# Patient Record
Sex: FEMALE | Race: WHITE | NOT HISPANIC OR LATINO | Employment: UNEMPLOYED | URBAN - METROPOLITAN AREA
[De-identification: names, ages, dates, MRNs, and addresses within clinical notes are randomized per-mention and may not be internally consistent; named-entity substitution may affect disease eponyms.]

---

## 2024-08-13 ENCOUNTER — NURSE TRIAGE (OUTPATIENT)
Age: 34
End: 2024-08-13

## 2024-08-13 NOTE — TELEPHONE ENCOUNTER
Regarding: rash  ----- Message from Ena HUA sent at 8/13/2024  9:53 AM EDT -----  [facility], she has a rash on her inner thigh that is itching and painful. She also mentioned possibly having thrush as well.

## 2024-08-13 NOTE — TELEPHONE ENCOUNTER
"Patient is calling in, she is a new patient, she recently moved to the area. She started with a red raised rash on the inner folds of her groin. She reports the area is itchy and painful. I got her in for 8/20, are we able to see if she can be seen prior to that? If not discussed urgent care since she is uncomfortable     Reason for Disposition   Red, moist, irritated area between skin folds (or under larger breasts)    Answer Assessment - Initial Assessment Questions  1. APPEARANCE of RASH: \"Describe the rash.\"       Red, raised, one spot  2. LOCATION: \"Where is the rash located?\"       Inner groin  3. NUMBER: \"How many spots are there?\"       One on each size  4. SIZE: \"How big are the spots?\" (Inches, centimeters or compare to size of a coin)       Larger per patient   5. ONSET: \"When did the rash start?\"       Yesterday noticed itching and pain  6. ITCHING: \"Does the rash itch?\" If Yes, ask: \"How bad is the itch?\"  (Scale 1-10; or mild, moderate, severe)      Yes   7. PAIN: \"Does the rash hurt?\" If Yes, ask: \"How bad is the pain?\"  (Scale 1-10; or mild, moderate, severe)      Yes    Protocols used: Rash or Redness - Localized-ADULT-[place]    "

## 2024-08-14 ENCOUNTER — OFFICE VISIT (OUTPATIENT)
Dept: OBGYN CLINIC | Facility: CLINIC | Age: 34
End: 2024-08-14
Payer: COMMERCIAL

## 2024-08-14 VITALS
WEIGHT: 233.6 LBS | SYSTOLIC BLOOD PRESSURE: 110 MMHG | BODY MASS INDEX: 35.4 KG/M2 | HEIGHT: 68 IN | DIASTOLIC BLOOD PRESSURE: 68 MMHG

## 2024-08-14 DIAGNOSIS — N90.89 VULVAR LESION: ICD-10-CM

## 2024-08-14 DIAGNOSIS — N76.0 VULVOVAGINITIS: Primary | ICD-10-CM

## 2024-08-14 DIAGNOSIS — Z11.3 SCREEN FOR STD (SEXUALLY TRANSMITTED DISEASE): ICD-10-CM

## 2024-08-14 PROCEDURE — 87660 TRICHOMONAS VAGIN DIR PROBE: CPT | Performed by: PHYSICIAN ASSISTANT

## 2024-08-14 PROCEDURE — 87510 GARDNER VAG DNA DIR PROBE: CPT | Performed by: PHYSICIAN ASSISTANT

## 2024-08-14 PROCEDURE — 87480 CANDIDA DNA DIR PROBE: CPT | Performed by: PHYSICIAN ASSISTANT

## 2024-08-14 PROCEDURE — 87491 CHLMYD TRACH DNA AMP PROBE: CPT | Performed by: PHYSICIAN ASSISTANT

## 2024-08-14 PROCEDURE — 87591 N.GONORRHOEAE DNA AMP PROB: CPT | Performed by: PHYSICIAN ASSISTANT

## 2024-08-14 PROCEDURE — 99203 OFFICE O/P NEW LOW 30 MIN: CPT | Performed by: PHYSICIAN ASSISTANT

## 2024-08-14 PROCEDURE — 87529 HSV DNA AMP PROBE: CPT | Performed by: PHYSICIAN ASSISTANT

## 2024-08-14 RX ORDER — FLUCONAZOLE 150 MG/1
150 TABLET ORAL ONCE
Qty: 1 TABLET | Refills: 1 | Status: SHIPPED | OUTPATIENT
Start: 2024-08-14 | End: 2024-08-14
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
## 2024-08-14 NOTE — PATIENT INSTRUCTIONS
Rx for Diflucan 150 mg x 1 dose with 1 refill sent to pharmacy. Take first dose today, then again in 48 hours if no improvement.    Call if symptoms worsen or change.

## 2024-08-15 ENCOUNTER — TELEPHONE (OUTPATIENT)
Dept: OBGYN CLINIC | Facility: CLINIC | Age: 34
End: 2024-08-15

## 2024-08-15 DIAGNOSIS — B96.89 BV (BACTERIAL VAGINOSIS): Primary | ICD-10-CM

## 2024-08-15 DIAGNOSIS — N76.0 BV (BACTERIAL VAGINOSIS): Primary | ICD-10-CM

## 2024-08-15 LAB
C TRACH DNA SPEC QL NAA+PROBE: NEGATIVE
CANDIDA RRNA VAG QL PROBE: DETECTED
G VAGINALIS RRNA GENITAL QL PROBE: DETECTED
HSV1 DNA SPEC QL NAA+PROBE: NOT DETECTED
HSV1 DNA SPEC QL NAA+PROBE: NOT DETECTED
N GONORRHOEA DNA SPEC QL NAA+PROBE: NEGATIVE
T VAGINALIS RRNA GENITAL QL PROBE: NOT DETECTED

## 2024-08-15 RX ORDER — CLINDAMYCIN PHOSPHATE 20 MG/G
1 CREAM VAGINAL
Qty: 40 G | Refills: 0 | Status: SHIPPED | OUTPATIENT
Start: 2024-08-15 | End: 2024-08-22

## 2024-08-15 NOTE — TELEPHONE ENCOUNTER
Spoke with patient regarding results.  HSV culture negative.  Vaginal culture positive for BV and Candida.  Rx for Cleocin 2% vaginal cream daily x 7 days sent to pharmacy.  Diflucan rx was sent yesterday.  Finish all medication.  Call if symptoms do not resolve.

## 2024-11-08 ENCOUNTER — OFFICE VISIT (OUTPATIENT)
Dept: OBGYN CLINIC | Facility: CLINIC | Age: 34
End: 2024-11-08
Payer: MEDICARE

## 2024-11-08 ENCOUNTER — NURSE TRIAGE (OUTPATIENT)
Age: 34
End: 2024-11-08

## 2024-11-08 VITALS
HEIGHT: 68 IN | WEIGHT: 234 LBS | DIASTOLIC BLOOD PRESSURE: 90 MMHG | SYSTOLIC BLOOD PRESSURE: 138 MMHG | BODY MASS INDEX: 35.46 KG/M2

## 2024-11-08 DIAGNOSIS — B35.9 TINEA: ICD-10-CM

## 2024-11-08 DIAGNOSIS — R39.9 UTI SYMPTOMS: Primary | ICD-10-CM

## 2024-11-08 DIAGNOSIS — N89.8 VAGINAL DISCHARGE: ICD-10-CM

## 2024-11-08 PROCEDURE — 87086 URINE CULTURE/COLONY COUNT: CPT | Performed by: OBSTETRICS & GYNECOLOGY

## 2024-11-08 PROCEDURE — 99213 OFFICE O/P EST LOW 20 MIN: CPT | Performed by: OBSTETRICS & GYNECOLOGY

## 2024-11-08 PROCEDURE — 87480 CANDIDA DNA DIR PROBE: CPT | Performed by: OBSTETRICS & GYNECOLOGY

## 2024-11-08 PROCEDURE — 87510 GARDNER VAG DNA DIR PROBE: CPT | Performed by: OBSTETRICS & GYNECOLOGY

## 2024-11-08 PROCEDURE — 87660 TRICHOMONAS VAGIN DIR PROBE: CPT | Performed by: OBSTETRICS & GYNECOLOGY

## 2024-11-08 RX ORDER — VALACYCLOVIR HYDROCHLORIDE 500 MG/1
500 TABLET, FILM COATED ORAL DAILY
COMMUNITY

## 2024-11-08 RX ORDER — METHADONE HYDROCHLORIDE 10 MG/1
40 TABLET ORAL EVERY 6 HOURS PRN
COMMUNITY

## 2024-11-08 RX ORDER — CLOBETASOL PROPIONATE 0.5 MG/G
1 CREAM TOPICAL 2 TIMES DAILY
COMMUNITY

## 2024-11-08 RX ORDER — GABAPENTIN 800 MG/1
TABLET ORAL
COMMUNITY

## 2024-11-08 RX ORDER — VENLAFAXINE HCL 75 MG
CAPSULE, EXT RELEASE 24 HR ORAL
COMMUNITY

## 2024-11-08 RX ORDER — GABAPENTIN 600 MG/1
600 TABLET ORAL 3 TIMES DAILY
COMMUNITY

## 2024-11-08 RX ORDER — DEXTROAMPHETAMINE SACCHARATE, AMPHETAMINE ASPARTATE MONOHYDRATE, DEXTROAMPHETAMINE SULFATE AND AMPHETAMINE SULFATE 7.5; 7.5; 7.5; 7.5 MG/1; MG/1; MG/1; MG/1
CAPSULE, EXTENDED RELEASE ORAL
COMMUNITY

## 2024-11-08 RX ORDER — NITROFURANTOIN 25; 75 MG/1; MG/1
100 CAPSULE ORAL 2 TIMES DAILY
Qty: 14 CAPSULE | Refills: 0 | Status: SHIPPED | OUTPATIENT
Start: 2024-11-08 | End: 2024-11-15

## 2024-11-08 RX ORDER — ESZOPICLONE 3 MG
TABLET ORAL
COMMUNITY

## 2024-11-08 RX ORDER — DEXTROAMPHETAMINE SACCHARATE, AMPHETAMINE ASPARTATE MONOHYDRATE, DEXTROAMPHETAMINE SULFATE AND AMPHETAMINE SULFATE 6.25; 6.25; 6.25; 6.25 MG/1; MG/1; MG/1; MG/1
1 CAPSULE, EXTENDED RELEASE ORAL DAILY
COMMUNITY

## 2024-11-08 RX ORDER — BUPROPION HYDROCHLORIDE 300 MG/1
300 TABLET ORAL DAILY
COMMUNITY

## 2024-11-08 RX ORDER — ETONOGESTREL AND ETHINYL ESTRADIOL VAGINAL RING .015; .12 MG/D; MG/D
RING VAGINAL
COMMUNITY

## 2024-11-08 RX ORDER — ZALEPLON 5 MG/1
CAPSULE ORAL
COMMUNITY

## 2024-11-08 RX ORDER — LORAZEPAM 0.5 MG/1
TABLET ORAL
COMMUNITY

## 2024-11-08 RX ORDER — CLOTRIMAZOLE AND BETAMETHASONE DIPROPIONATE 10; .64 MG/G; MG/G
CREAM TOPICAL 2 TIMES DAILY
Qty: 15 G | Refills: 0 | Status: SHIPPED | OUTPATIENT
Start: 2024-11-08

## 2024-11-08 NOTE — TELEPHONE ENCOUNTER
"Patient calling in with onset of vaginal burning she rates at 9/10, along with 7/10 external vaginal itching. Also notes urinary frequency and burning with urination. Had some pelvic cramping last night. Unsure if she is experiencing yeast vs. BV vs. UTI. Symptoms started about 4-5 days ago. Denies fever, abnormal vaginal bleeding, vaginal odor or vaginal discharge. Was seen in August where she was diagnosed with Vulvovaginitis, however, states she did not apply topical vaginal antibiotic daily as prescribed. RN advised due to multiple symptoms, patient should come in to be evaluated by provider in the office. Pt agreeable to plan. RN scheduled patient for appointment this afternoon. No further questions.       Reason for Disposition  • MILD-MODERATE vaginal pain and present > 24 hours (Exception: Chronic pain.)    Answer Assessment - Initial Assessment Questions  1. SYMPTOM: \"What's the main symptom you're concerned about?\" (e.g., pain, itching, dryness)      Frequency, burning with urination and vaginal burning and itching,   2. LOCATION: \"Where is the  s/s located?\" (e.g., inside/outside, left/right)      External  3. ONSET: \"When did the  s/s  start?\"      4-5 days ago  4. PAIN: \"Is there any pain?\" If Yes, ask: \"How bad is it?\" (Scale: 1-10; mild, moderate, severe)      9/10  5. ITCHING: \"Is there any itching?\" If Yes, ask: \"How bad is it?\" (Scale: 1-10; mild, moderate, severe)      6/10  6. CAUSE: \"What do you think is causing the discharge?\" \"Have you had the same problem before?\" \"What happened then?\"      Yeast, BV and possible UTI  7. OTHER SYMPTOMS: \"Do you have any other symptoms?\" (e.g., fever, itching, vaginal bleeding, pain with urination, injury to genital area, vaginal foreign body)      Intermittent cramping last night, heavier vaginal discharge  8. PREGNANCY: \"Is there any chance you are pregnant?\" \"When was your last menstrual period?\"      Denies    Protocols used: Vaginal Symptoms-Adult-[place]    "

## 2024-11-08 NOTE — PROGRESS NOTES
"Assessment/Plan:     There are no diagnoses linked to this encounter.     34-year-old female  Tinea on the right inguinal area  History of BV  UTI symptom  Plan  Urine culture  Macrobid  Female hygiene reviewed and discussed with patient  Clotrimazole cream  Affirm obtained to check for any recurrent BV  We will call patient with all results  Subjective:      Patient ID: Vijaya Nguyen is a 34 y.o. female.  34-year-old female present to the office today complaining of UTI symptom also complaining of vulvar itching and irritation on her inguinal area on the right side  Vaginal Discharge  The patient's primary symptoms include vaginal discharge. Primary symptoms comment: uti symptoms. This is a new problem. Episode onset: uti sympotms 2-3days ago , yeast infection last week , bv no symptoms. The pain is mild. Associated symptoms include dysuria, frequency and urgency. Pertinent negatives include no constipation, diarrhea, flank pain or painful intercourse. The vaginal discharge was white. There has been no bleeding. The symptoms are aggravated by urinating. Contraceptive use: nuvaring.       The following portions of the patient's history were reviewed and updated as appropriate: allergies, current medications, past family history, past medical history, past social history, past surgical history and problem list.    Review of Systems   Gastrointestinal:  Negative for constipation and diarrhea.   Genitourinary:  Positive for dysuria, frequency, urgency and vaginal discharge. Negative for flank pain.         Objective:      /90 (BP Location: Left arm, Patient Position: Sitting, Cuff Size: Adult)   Ht 5' 8\" (1.727 m)   Wt 106 kg (234 lb)   BMI 35.58 kg/m²          Physical Exam  Constitutional:       Appearance: She is well-developed.   Abdominal:      General: There is no distension.      Palpations: Abdomen is soft.      Tenderness: There is no abdominal tenderness.   Genitourinary:     Labia:         " Right: No rash, tenderness or lesion.         Left: No rash, tenderness or lesion.       Vagina: No signs of injury. No vaginal discharge, erythema or tenderness.      Cervix: No cervical motion tenderness, discharge or friability.      Adnexa:         Right: No mass, tenderness or fullness.          Left: No mass, tenderness or fullness.            Comments: Mild erythema well-demarcated at the marked area  Neurological:      Mental Status: She is alert and oriented to person, place, and time.   Psychiatric:         Behavior: Behavior normal.

## 2024-11-09 LAB
BACTERIA UR CULT: NORMAL
CANDIDA RRNA VAG QL PROBE: DETECTED
G VAGINALIS RRNA GENITAL QL PROBE: NOT DETECTED
T VAGINALIS RRNA GENITAL QL PROBE: NOT DETECTED

## 2024-11-10 DIAGNOSIS — B37.31 CANDIDA VAGINITIS: Primary | ICD-10-CM

## 2024-11-10 RX ORDER — FLUCONAZOLE 150 MG/1
150 TABLET ORAL
Qty: 2 TABLET | Refills: 0 | Status: SHIPPED | OUTPATIENT
Start: 2024-11-10 | End: 2024-11-14

## 2024-11-11 ENCOUNTER — TELEPHONE (OUTPATIENT)
Age: 34
End: 2024-11-11

## 2024-11-17 DIAGNOSIS — R39.9 UTI SYMPTOMS: ICD-10-CM

## 2024-11-18 ENCOUNTER — NURSE TRIAGE (OUTPATIENT)
Age: 34
End: 2024-11-18

## 2024-11-18 NOTE — TELEPHONE ENCOUNTER
Patient called and was seen by Dr Guillermo on 11/8 for uti and was given macrobid. She finished it and her uti came back and she asked if she could have a refill of that or she said bactrim usually works for her.. She goes to [facility] in [place].  Please call patient back at [phone] . Thank you

## 2024-11-18 NOTE — TELEPHONE ENCOUNTER
"Patient took Macrobid as prescribed beginning 11/08/24. States urinary symptoms went away for a few days towards the end of the medication course. States began again with symptoms beginning Friday. Patient reports dysuria, urethral pain with sitting, back pain, fevers/chills. Currently denies fevers chills. States back pain was bad last night into this morning but improved slightly throughout the day. Patient states in the past, she has taken Bactrim and it seems to work. States Bactrim does not make her sick. Routing to provider for advice.  Informed patient to go to ER with uncontrolled fevers/chills or severe pain. Patient verbalized understanding.      Answer Assessment - Initial Assessment Questions  1. SYMPTOM: \"What's the main symptom you're concerned about?\" (e.g., frequency, incontinence)      Dysuria, general pain in urethra when sitting, back pain  2. ONSET: \"When did the  s/s  start?\"      Some symptoms Friday.   3. PAIN: \"Is there any pain?\" If Yes, ask: \"How bad is it?\" (Scale: 1-10; mild, moderate, severe)      7-8/10  4. CAUSE: \"What do you think is causing the symptoms?\"      UTI  5. OTHER SYMPTOMS: \"Do you have any other symptoms?\" (e.g., blood in urine, fever, flank pain, pain with urination)  This morning, drenched in sweat. Back was hurting last night. Chills were last night and night before.   Currently feeling less feverish.   6. PREGNANCY: \"Is there any chance you are pregnant?\" \"When was your last menstrual period?\"      LMP 10/16 or 10/18    Protocols used: Urinary Symptoms-Adult-[place]    "

## 2024-11-19 ENCOUNTER — HOSPITAL ENCOUNTER (EMERGENCY)
Facility: HOSPITAL | Age: 34
Discharge: HOME | End: 2024-11-19
Attending: EMERGENCY MEDICINE
Payer: MEDICARE

## 2024-11-19 ENCOUNTER — APPOINTMENT (EMERGENCY)
Dept: CT IMAGING | Facility: HOSPITAL | Age: 34
End: 2024-11-19
Payer: MEDICARE

## 2024-11-19 VITALS
DIASTOLIC BLOOD PRESSURE: 79 MMHG | TEMPERATURE: 98.1 F | HEART RATE: 99 BPM | SYSTOLIC BLOOD PRESSURE: 140 MMHG | OXYGEN SATURATION: 99 % | BODY MASS INDEX: 36.67 KG/M2 | WEIGHT: 241.18 LBS | RESPIRATION RATE: 18 BRPM

## 2024-11-19 DIAGNOSIS — R30.0 DYSURIA: ICD-10-CM

## 2024-11-19 DIAGNOSIS — R10.13 EPIGASTRIC PAIN: Primary | ICD-10-CM

## 2024-11-19 LAB
ALBUMIN SERPL BCG-MCNC: 4.1 G/DL (ref 3.5–5)
ALP SERPL-CCNC: 54 U/L (ref 34–104)
ALT SERPL W P-5'-P-CCNC: 22 U/L (ref 7–52)
ANION GAP SERPL CALCULATED.3IONS-SCNC: 7 MMOL/L (ref 4–13)
AST SERPL W P-5'-P-CCNC: 18 U/L (ref 13–39)
BASOPHILS # BLD AUTO: 0.05 THOUSANDS/ÂΜL (ref 0–0.1)
BASOPHILS NFR BLD AUTO: 1 % (ref 0–1)
BILIRUB SERPL-MCNC: 0.3 MG/DL (ref 0.2–1)
BILIRUB UR QL STRIP: NEGATIVE
BUN SERPL-MCNC: 13 MG/DL (ref 5–25)
CALCIUM SERPL-MCNC: 9.3 MG/DL (ref 8.4–10.2)
CARDIAC TROPONIN I PNL SERPL HS: 3 NG/L (ref ?–50)
CHLORIDE SERPL-SCNC: 103 MMOL/L (ref 96–108)
CLARITY UR: CLEAR
CO2 SERPL-SCNC: 27 MMOL/L (ref 21–32)
COLOR UR: YELLOW
CREAT SERPL-MCNC: 0.68 MG/DL (ref 0.6–1.3)
D DIMER PPP FEU-MCNC: 0.31 UG/ML FEU
EOSINOPHIL # BLD AUTO: 0.16 THOUSAND/ÂΜL (ref 0–0.61)
EOSINOPHIL NFR BLD AUTO: 2 % (ref 0–6)
ERYTHROCYTE [DISTWIDTH] IN BLOOD BY AUTOMATED COUNT: 12.9 % (ref 11.6–15.1)
EXT PREGNANCY TEST URINE: NEGATIVE
EXT. CONTROL: NORMAL
GFR SERPL CREATININE-BSD FRML MDRD: 116 ML/MIN/1.73SQ M
GLUCOSE SERPL-MCNC: 91 MG/DL (ref 65–140)
GLUCOSE UR STRIP-MCNC: NEGATIVE MG/DL
HCT VFR BLD AUTO: 41.9 % (ref 34.8–46.1)
HGB BLD-MCNC: 13.9 G/DL (ref 11.5–15.4)
HGB UR QL STRIP.AUTO: NEGATIVE
IMM GRANULOCYTES # BLD AUTO: 0.03 THOUSAND/UL (ref 0–0.2)
IMM GRANULOCYTES NFR BLD AUTO: 0 % (ref 0–2)
KETONES UR STRIP-MCNC: NEGATIVE MG/DL
LEUKOCYTE ESTERASE UR QL STRIP: NEGATIVE
LIPASE SERPL-CCNC: 17 U/L (ref 11–82)
LYMPHOCYTES # BLD AUTO: 2.36 THOUSANDS/ÂΜL (ref 0.6–4.47)
LYMPHOCYTES NFR BLD AUTO: 25 % (ref 14–44)
MCH RBC QN AUTO: 31.9 PG (ref 26.8–34.3)
MCHC RBC AUTO-ENTMCNC: 33.2 G/DL (ref 31.4–37.4)
MCV RBC AUTO: 96 FL (ref 82–98)
MONOCYTES # BLD AUTO: 0.52 THOUSAND/ÂΜL (ref 0.17–1.22)
MONOCYTES NFR BLD AUTO: 6 % (ref 4–12)
NEUTROPHILS # BLD AUTO: 6.25 THOUSANDS/ÂΜL (ref 1.85–7.62)
NEUTS SEG NFR BLD AUTO: 66 % (ref 43–75)
NITRITE UR QL STRIP: NEGATIVE
NRBC BLD AUTO-RTO: 0 /100 WBCS
PH UR STRIP.AUTO: 7 [PH]
PLATELET # BLD AUTO: 325 THOUSANDS/UL (ref 149–390)
PMV BLD AUTO: 8.2 FL (ref 8.9–12.7)
POTASSIUM SERPL-SCNC: 4.3 MMOL/L (ref 3.5–5.3)
PROT SERPL-MCNC: 7.2 G/DL (ref 6.4–8.4)
PROT UR STRIP-MCNC: NEGATIVE MG/DL
RBC # BLD AUTO: 4.36 MILLION/UL (ref 3.81–5.12)
SODIUM SERPL-SCNC: 137 MMOL/L (ref 135–147)
SP GR UR STRIP.AUTO: 1.02 (ref 1–1.03)
UROBILINOGEN UR QL STRIP.AUTO: 0.2 E.U./DL
WBC # BLD AUTO: 9.37 THOUSAND/UL (ref 4.31–10.16)

## 2024-11-19 PROCEDURE — 96375 TX/PRO/DX INJ NEW DRUG ADDON: CPT

## 2024-11-19 PROCEDURE — 74177 CT ABD & PELVIS W/CONTRAST: CPT

## 2024-11-19 PROCEDURE — 99285 EMERGENCY DEPT VISIT HI MDM: CPT | Performed by: PHYSICIAN ASSISTANT

## 2024-11-19 PROCEDURE — 81025 URINE PREGNANCY TEST: CPT | Performed by: PHYSICIAN ASSISTANT

## 2024-11-19 PROCEDURE — 87661 TRICHOMONAS VAGINALIS AMPLIF: CPT | Performed by: PHYSICIAN ASSISTANT

## 2024-11-19 PROCEDURE — 81003 URINALYSIS AUTO W/O SCOPE: CPT | Performed by: PHYSICIAN ASSISTANT

## 2024-11-19 PROCEDURE — 85025 COMPLETE CBC W/AUTO DIFF WBC: CPT | Performed by: PHYSICIAN ASSISTANT

## 2024-11-19 PROCEDURE — 96374 THER/PROPH/DIAG INJ IV PUSH: CPT

## 2024-11-19 PROCEDURE — 87563 M. GENITALIUM AMP PROBE: CPT | Performed by: PHYSICIAN ASSISTANT

## 2024-11-19 PROCEDURE — 93005 ELECTROCARDIOGRAM TRACING: CPT

## 2024-11-19 PROCEDURE — 36415 COLL VENOUS BLD VENIPUNCTURE: CPT | Performed by: PHYSICIAN ASSISTANT

## 2024-11-19 PROCEDURE — 83690 ASSAY OF LIPASE: CPT | Performed by: PHYSICIAN ASSISTANT

## 2024-11-19 PROCEDURE — 99284 EMERGENCY DEPT VISIT MOD MDM: CPT

## 2024-11-19 PROCEDURE — 84484 ASSAY OF TROPONIN QUANT: CPT | Performed by: PHYSICIAN ASSISTANT

## 2024-11-19 PROCEDURE — 85379 FIBRIN DEGRADATION QUANT: CPT | Performed by: PHYSICIAN ASSISTANT

## 2024-11-19 PROCEDURE — 80053 COMPREHEN METABOLIC PANEL: CPT | Performed by: PHYSICIAN ASSISTANT

## 2024-11-19 RX ORDER — ALUMINA, MAGNESIA, AND SIMETHICONE 2400; 2400; 240 MG/30ML; MG/30ML; MG/30ML
10 SUSPENSION ORAL EVERY 6 HOURS PRN
Qty: 355 ML | Refills: 0 | Status: SHIPPED | OUTPATIENT
Start: 2024-11-19

## 2024-11-19 RX ORDER — ACETAMINOPHEN 10 MG/ML
1000 INJECTION, SOLUTION INTRAVENOUS ONCE
Status: DISCONTINUED | OUTPATIENT
Start: 2024-11-19 | End: 2024-11-19

## 2024-11-19 RX ORDER — FAMOTIDINE 10 MG/ML
20 INJECTION, SOLUTION INTRAVENOUS ONCE
Status: COMPLETED | OUTPATIENT
Start: 2024-11-19 | End: 2024-11-19

## 2024-11-19 RX ORDER — KETOROLAC TROMETHAMINE 30 MG/ML
15 INJECTION, SOLUTION INTRAMUSCULAR; INTRAVENOUS ONCE
Status: COMPLETED | OUTPATIENT
Start: 2024-11-19 | End: 2024-11-19

## 2024-11-19 RX ORDER — SUCRALFATE 1 G/1
1 TABLET ORAL 4 TIMES DAILY
Qty: 120 TABLET | Refills: 0 | Status: SHIPPED | OUTPATIENT
Start: 2024-11-19

## 2024-11-19 RX ADMIN — FAMOTIDINE 20 MG: 10 INJECTION, SOLUTION INTRAVENOUS at 11:25

## 2024-11-19 RX ADMIN — IOHEXOL 90 ML: 350 INJECTION, SOLUTION INTRAVENOUS at 14:01

## 2024-11-19 RX ADMIN — KETOROLAC TROMETHAMINE 15 MG: 30 INJECTION, SOLUTION INTRAMUSCULAR at 12:27

## 2024-11-19 NOTE — DISCHARGE INSTRUCTIONS
Please return to the emergency department for worsening symptoms including chest pain, shortness of breath, dizziness, lightheadedness, fever greater than 103, severe pain, inability to walk, fainting episodes, etc..  Please follow-up with your family practice provider as soon as possible.  I have sent medications over to the pharmacy for your symptoms.  Please take as directed.  With continued urinary symptoms, follow-up with a urologist.  I have given you a referral.  Please avoid spicy food, chocolate, and caffeine at home.  Avoid NSAIDs including ibuprofen, Motrin, Aleve, Excedrin, and aspirin.

## 2024-11-20 DIAGNOSIS — R39.9 UTI SYMPTOMS: Primary | ICD-10-CM

## 2024-11-20 LAB
ATRIAL RATE: 95 BPM
M GENITALIUM DNA SPEC QL NAA+PROBE: NEGATIVE
P AXIS: 56 DEGREES
PR INTERVAL: 136 MS
QRS AXIS: 54 DEGREES
QRSD INTERVAL: 82 MS
QT INTERVAL: 346 MS
QTC INTERVAL: 434 MS
T VAGINALIS DNA SPEC QL NAA+PROBE: NEGATIVE
T WAVE AXIS: 12 DEGREES
VENTRICULAR RATE: 95 BPM

## 2024-11-20 PROCEDURE — 93010 ELECTROCARDIOGRAM REPORT: CPT | Performed by: INTERNAL MEDICINE

## 2024-11-20 NOTE — TELEPHONE ENCOUNTER
LVM for pt to call office, if pt calls please ask if she is still having sx's and if she needs a refill on medication

## 2024-11-22 RX ORDER — NITROFURANTOIN 25; 75 MG/1; MG/1
CAPSULE ORAL
Qty: 14 CAPSULE | Refills: 0 | Status: SHIPPED | OUTPATIENT
Start: 2024-11-22

## 2024-11-24 NOTE — ED PROVIDER NOTES
Time reflects when diagnosis was documented in both MDM as applicable and the Disposition within this note       Time User Action Codes Description Comment    11/19/2024  2:48 PM Adonis Bob [R10.13] Epigastric pain     11/19/2024  2:49 PM Adonis Bob [R30.0] Dysuria           ED Disposition       ED Disposition   Discharge    Condition   Stable    Date/Time   Tue Nov 19, 2024  2:48 PM    Comment   Vijaya Nguyen discharge to home/self care.                   Assessment & Plan       Medical Decision Making  34-year-old female presenting to the emergency department today for UTI symptoms despite Macrobid therapy.  Patient also has epigastric pain and low back pain.  Epigastric pain does not radiate to the low back; the patient notes these are 2 separate entities.  She has nausea but no episodes of vomiting.  Her vitals are stable.  On physical examination, the patient has tenderness to palpation to the midline epigastrium however has a negative Hopkins sign.  EKG shows normal sinus rhythm with a rate of 95.  Urinalysis without signs of infection at this time.  Negative trichomonas and mycoplasma genitalium test.  Negative D-dimer.  Negative troponin x 1.  CMP is reassuring.  Negative lipase.  Negative pregnancy test.  No leukocytosis.  CT abdomen and pelvis without acute intra-abdominal pathology.  The patient was dosed with Pepcid and Toradol while here in the emergency department with some relief of symptoms.  The patient is stable for discharge at this time.  Mylanta, Prilosec, Carafate sent to the patient's pharmacy.  Follow-up with urology.  Referral placed.  Strict return precautions were given.  Recommend PCP follow-up as soon as possible. The patient and/or patient's proxy verify their understanding and agree to the plan at this time.  All questions answered to the patient and/or their proxy's satisfaction.  All labs reviewed and utilized in the medical decision making process  "(if labs were ordered).  Portions of the record may have been created with voice recognition software.  Occasional wrong word or \"sound a like\" substitutions may have occurred due to the inherent limitations of voice recognition software.  Read the chart carefully and recognize, using context, where substitutions have occurred.    I reviewed prior notes.  I reviewed prior labs.    Problems Addressed:  Dysuria: undiagnosed new problem with uncertain prognosis  Epigastric pain: undiagnosed new problem with uncertain prognosis    Amount and/or Complexity of Data Reviewed  External Data Reviewed: labs and notes.  Labs: ordered. Decision-making details documented in ED Course.  Radiology: ordered. Decision-making details documented in ED Course.  ECG/medicine tests: ordered and independent interpretation performed. Decision-making details documented in ED Course.    Risk  OTC drugs.  Prescription drug management.             Medications   Famotidine (PF) (PEPCID) injection 20 mg (20 mg Intravenous Given 11/19/24 1125)   ketorolac (TORADOL) injection 15 mg (15 mg Intravenous Given 11/19/24 1227)   iohexol (OMNIPAQUE) 350 MG/ML injection (SINGLE-DOSE) 90 mL (90 mL Intravenous Given 11/19/24 1401)       ED Risk Strat Scores                           SBIRT 20yo+      Flowsheet Row Most Recent Value   Initial Alcohol Screen: US AUDIT-C     1. How often do you have a drink containing alcohol? 0 Filed at: 11/19/2024 1042   2. How many drinks containing alcohol do you have on a typical day you are drinking?  0 Filed at: 11/19/2024 1042   3b. FEMALE Any Age, or MALE 65+: How often do you have 4 or more drinks on one occassion? 0 Filed at: 11/19/2024 1042   Audit-C Score 0 Filed at: 11/19/2024 1042   CITLALI: How many times in the past year have you...    Used an illegal drug or used a prescription medication for non-medical reasons? Never Filed at: 11/19/2024 1042                            History of Present Illness       Chief " "Complaint   Patient presents with    Back Pain     Recent uti treated with macrobid.  States it came back \"full force\" on Saturday.  Reports pain in upper and lower back.  Also pain in epigastric area.  Took a Robaxin at 730 this morning but it didn't help.  Called gynecologist this morning for another abx        History reviewed. No pertinent past medical history.   History reviewed. No pertinent surgical history.   History reviewed. No pertinent family history.   Social History     Tobacco Use    Smoking status: Every Day     Current packs/day: 0.50     Types: Cigarettes    Smokeless tobacco: Never   Substance Use Topics    Alcohol use: Yes     Comment: 2x per mo    Drug use: Yes     Types: Marijuana      E-Cigarette/Vaping      E-Cigarette/Vaping Substances    Nicotine Yes     THC Yes     Flavoring Yes       I have reviewed and agree with the history as documented.     This is a 34-year-old female with past medical history significant for recently diagnosed urinary tract infection treated with Macrobid presenting to the emergency department today for numerous medical complaints.  The patient notes that for the past few days she has felt like her urinary tract infection symptoms such as dysuria and foul-smelling urine have returned.  She notes that \"it is back in full force\".  She is taking Azo tablets with some relief.  She also notes pain in her midline epigastrium but this does not radiate to the back.  She denies any flank pain while here.  She denies any overt chest pain or difficulty breathing.  She took Robaxin without relief.  She notes that the abdominal pain does not radiate through to the back but she does have independent low back pain without any radiation down the legs.  She has no numbness or tingling.  She has no bowel incontinence, bladder incontinence, or saddle anesthesia.  She denies any fevers or chills.  She notes myalgias.  She notes nausea without vomiting.  No diarrhea or constipation.  No " "vaginal bleeding or vaginal discharge.  She does not believe she has a sexually transmitted infection.  The patient denies other complaints at this time.      History provided by:  Patient   used: No    Difficulty Urinating  Pain quality:  Burning  Pain severity:  Moderate  Onset quality:  Gradual  Duration: \"a few days\"  Timing:  Intermittent  Progression:  Waxing and waning  Chronicity:  New  Relieved by: Azo.  Worsened by:  Nothing  Ineffective treatments:  None tried  Urinary symptoms: foul-smelling urine and frequent urination    Urinary symptoms: no discolored urine, no hematuria, no hesitancy and no bladder incontinence    Associated symptoms: abdominal pain and nausea    Associated symptoms: no fever, no flank pain, no genital lesions, no vaginal discharge and no vomiting        Review of Systems   Constitutional:  Negative for appetite change, chills, diaphoresis, fatigue and fever.   Respiratory:  Negative for cough, chest tightness, shortness of breath and wheezing.    Cardiovascular:  Negative for chest pain, palpitations and leg swelling.   Gastrointestinal:  Positive for abdominal pain and nausea. Negative for constipation, diarrhea and vomiting.   Genitourinary:  Positive for dysuria. Negative for flank pain and vaginal discharge.   Musculoskeletal:  Positive for myalgias. Negative for neck pain and neck stiffness.   Skin:  Negative for rash and wound.   Neurological:  Negative for dizziness, seizures, syncope, weakness, light-headedness, numbness and headaches.   Psychiatric/Behavioral:  Negative for confusion.    All other systems reviewed and are negative.          Objective       ED Triage Vitals [11/19/24 1039]   Temperature Pulse Blood Pressure Respirations SpO2 Patient Position - Orthostatic VS   98.1 °F (36.7 °C) 94 156/75 16 95 % Lying      Temp Source Heart Rate Source BP Location FiO2 (%) Pain Score    Oral Monitor Left arm -- 8      Vitals      Date and Time Temp Pulse " SpO2 Resp BP Pain Score FACES Pain Rating User   11/19/24 1330 -- 99 99 % 18 140/79 3 -- RR   11/19/24 1227 -- -- -- -- -- 6 -- BL   11/19/24 1039 98.1 °F (36.7 °C) 94 95 % 16 156/75 8 -- RR            Physical Exam  Vitals and nursing note reviewed.   Constitutional:       General: She is not in acute distress.     Appearance: Normal appearance. She is normal weight. She is not ill-appearing, toxic-appearing or diaphoretic.   HENT:      Head: Normocephalic and atraumatic.      Nose: Nose normal. No congestion or rhinorrhea.      Mouth/Throat:      Mouth: Mucous membranes are moist.      Pharynx: No oropharyngeal exudate or posterior oropharyngeal erythema.   Eyes:      General: No scleral icterus.        Right eye: No discharge.         Left eye: No discharge.      Extraocular Movements: Extraocular movements intact.      Pupils: Pupils are equal, round, and reactive to light.   Cardiovascular:      Rate and Rhythm: Normal rate and regular rhythm.      Pulses: Normal pulses.      Heart sounds: Normal heart sounds. No murmur heard.     No friction rub. No gallop.   Pulmonary:      Effort: Pulmonary effort is normal. No respiratory distress.      Breath sounds: Normal breath sounds. No stridor. No wheezing, rhonchi or rales.   Chest:      Chest wall: No tenderness.   Abdominal:      General: Abdomen is flat. There is no distension.      Palpations: Abdomen is soft.      Tenderness: There is abdominal tenderness. There is no right CVA tenderness, left CVA tenderness, guarding or rebound.      Comments: The patient has tenderness to palpation to the midline epigastric region but has a negative Hopkins sign.  No rebound or Rovsing.  Negative McBurney's point tenderness.  Nonperitoneal.   Musculoskeletal:         General: Normal range of motion.      Cervical back: Normal range of motion. No tenderness.      Right lower leg: No edema.      Left lower leg: No edema.      Comments: Tenderness to palpation to the midline  lumbar spine without any step-offs or deformities.   Skin:     General: Skin is warm and dry.      Capillary Refill: Capillary refill takes less than 2 seconds.      Coloration: Skin is not jaundiced or pale.   Neurological:      General: No focal deficit present.      Mental Status: She is alert and oriented to person, place, and time. Mental status is at baseline.   Psychiatric:         Mood and Affect: Mood normal.         Behavior: Behavior normal.         Results Reviewed       Procedure Component Value Units Date/Time    Trichomonas vaginalis/Mycoplasma genitalium PCR [[id number]]  (Normal) Collected: 11/19/24 1118    Lab Status: Final result Specimen: Urine, Voided Updated: 11/20/24 7966     Trichomonas vaginalis Negative     Mycoplasma genitalium Negative    Narrative:      This test was performed using the FDA-approved Demetrius 410 Labs0 TV/MG assay ([employer]). This test uses real-time PCR to detect Trichomonas vaginalis (TV) and Mycoplasma genitalium (MG) DNA, to assist in identification of suspected infections. This instrument and assay have been validated by the  and performing laboratory and verified by the performing laboratory. Clinically validated specimen sources include urine and swabs (endocervical/vaginal). This test has not been evaluated in patients younger than 18 years of age.   Cervical specimens collected in [employer]® Solution are validated for the detection of Trichomonas vaginalis only.  Note that vaginal swabs are considered to be the most sensitive specimen type for MG testing.  Trichomonas vaginalis is a protozoan/amoebic infection that can be transmitted with sexual contact. Appropriate treatment of oneself and of sexual partners should be sought to avoid re-infection.  Mycoplasma genitalium is an increasingly identified and treatable bacterial infection and may be asymptomatic. Long term complications may result from untreated infections. Sexual transmission is  possible.    Procedural Limitations  This assay has only been validated for use with male and female urine, clinician-instructed self-collected vaginal swab specimens, clinician-collected vaginal swab specimens, endocervical swab specimens collected in [employer]anty® PCR Media. This assay also detects TV DNA in cervical specimens collected in PreservCyt® Solution. Assay performance has not been validated for use with other collection media and/or specimen types.   Detection of Trichomonas vaginalis and/or Mycoplasma genitalium is dependent on the number of organisms present in the specimen. Detection may be affected by specimen collection methods, patient factors, stage of infection, infecting strains, and presence of PCR inhibitors.   A negative result does not preclude the presence of infection as results depend on adequate specimen collection, absence of inhibitors, and sufficient DNA to be detected.   All results should be interpreted in conjunction with other clinical and laboratory data.  The presence of mucus in endocervical specimens may lead to false or invalid results.   Urine testing is recommended to be performed on first catch urine samples. The effects of other collection variables have not been evaluated at this time. The effects of vaginal discharge, tampon use, douching, and other collection variables have not been evaluated at this time.   This assay has not been evaluated with patients currently being treated with antimicrobial agents active against TV or MG, or patients with a history of hysterectomy.       D-dimer, quantitative [[id number]]  (Normal) Collected: 11/19/24 1226    Lab Status: Final result Specimen: Blood from Arm, Right Updated: 11/19/24 1343     D-Dimer, Quant 0.31 ug/ml FEU     HS Troponin 0hr (reflex protocol) [[id number]]  (Normal) Collected: 11/19/24 1124    Lab Status: Final result Specimen: Blood from Arm, Right Updated: 11/19/24 1154     hs TnI 0hr 3 ng/L     Comprehensive metabolic  panel [[id number]] Collected: 11/19/24 1124    Lab Status: Final result Specimen: Blood from Arm, Right Updated: 11/19/24 1147     Sodium 137 mmol/L      Potassium 4.3 mmol/L      Chloride 103 mmol/L      CO2 27 mmol/L      ANION GAP 7 mmol/L      BUN 13 mg/dL      Creatinine 0.68 mg/dL      Glucose 91 mg/dL      Calcium 9.3 mg/dL      AST 18 U/L      ALT 22 U/L      Alkaline Phosphatase 54 U/L      Total Protein 7.2 g/dL      Albumin 4.1 g/dL      Total Bilirubin 0.30 mg/dL      eGFR 116 ml/min/1.73sq m     Narrative:      National Kid[employer] guidelines for Chronic Kidney Disease (CKD):     Stage 1 with normal or high GFR (GFR > 90 mL/min/1.73 square meters)    Stage 2 Mild CKD (GFR = 60-89 mL/min/1.73 square meters)    Stage 3A Moderate CKD (GFR = 45-59 mL/min/1.73 square meters)    Stage 3B Moderate CKD (GFR = 30-44 mL/min/1.73 square meters)    Stage 4 Severe CKD (GFR = 15-29 mL/min/1.73 square meters)    Stage 5 End Stage CKD (GFR <15 mL/min/1.73 square meters)  Note: GFR calculation is accurate only with a steady state creatinine    Lipase [[id number]]  (Normal) Collected: 11/19/24 1124    Lab Status: Final result Specimen: Blood from Arm, Right Updated: 11/19/24 1147     Lipase 17 u/L     UA w Reflex to Microscopic w Reflex to Culture [[id number]]  (Normal) Collected: 11/19/24 1117    Lab Status: Final result Specimen: Urine, Clean Catch Updated: 11/19/24 1133     Color, UA Yellow     Clarity, UA Clear     Specific Gravity, UA 1.025     pH, UA 7.0     Leukocytes, UA Negative     Nitrite, UA Negative     Protein, UA Negative mg/dl      Glucose, UA Negative mg/dl      Ketones, UA Negative mg/dl      Urobilinogen, UA 0.2 E.U./dl      Bilirubin, UA Negative     Occult Blood, UA Negative    CBC and differential [[id number]]  (Abnormal) Collected: 11/19/24 1124    Lab Status: Final result Specimen: Blood from Arm, Right Updated: 11/19/24 1130     WBC 9.37 Thousand/uL      RBC 4.36 Million/uL       Hemoglobin 13.9 g/dL      Hematocrit 41.9 %      MCV 96 fL      MCH 31.9 pg      MCHC 33.2 g/dL      RDW 12.9 %      MPV 8.2 fL      Platelets 325 Thousands/uL      nRBC 0 /100 WBCs      Segmented % 66 %      Immature Grans % 0 %      Lymphocytes % 25 %      Monocytes % 6 %      Eosinophils Relative 2 %      Basophils Relative 1 %      Absolute Neutrophils 6.25 Thousands/µL      Absolute Immature Grans 0.03 Thousand/uL      Absolute Lymphocytes 2.36 Thousands/µL      Absolute Monocytes 0.52 Thousand/µL      Eosinophils Absolute 0.16 Thousand/µL      Basophils Absolute 0.05 Thousands/µL     POCT pregnancy, urine [[id number]]  (Normal) Collected: 11/19/24 1123    Lab Status: Final result Updated: 11/19/24 1123     EXT Preg Test, Ur Negative     Control Valid            CT abdomen pelvis with contrast   Final Interpretation by Ayo Cook MD (11/19 1435)      No acute abnormality in the abdomen or pelvis.         Workstation performed: [id number]             ECG 12 Lead Documentation Only    Date/Time: 11/19/2024 11:22 AM    Performed by: Adonis Bob PA-C  Authorized by: Adonis Bob PA-C    Indications / Diagnosis:  Epigastric Pain  Patient location:  ED  Previous ECG:     Previous ECG:  Unavailable  Interpretation:     Interpretation: normal    Rate:     ECG rate assessment: normal    Rhythm:     Rhythm: sinus rhythm    Ectopy:     Ectopy: none    QRS:     QRS axis:  Normal  Conduction:     Conduction: normal    ST segments:     ST segments:  Normal  T waves:     T waves: non-specific    Comments:      This is normal sinus rhythm with a rate of 95.  Normal axis.  No ectopy.  No STEMI.      ED Medication and Procedure Management   Prior to Admission Medications   Prescriptions Last Dose Informant Patient Reported? Taking?   Effexor XR 75 MG 24 hr capsule   Yes No   LORazepam (ATIVAN) 0.5 mg tablet   Yes No   Lunesta 3 MG tablet   Yes No   amphetamine-dextroamphetamine  (ADDERALL XR) 25 MG 24 hr capsule   Yes No   Sig: Take 1 tablet by mouth daily   Patient not taking: Reported on 11/8/2024   amphetamine-dextroamphetamine (ADDERALL XR, 30MG,) 30 MG 24 hr capsule   Yes No   buPROPion (WELLBUTRIN XL) 300 mg 24 hr tablet   Yes No   Sig: Take 300 mg by mouth daily   cephalexin (KEFLEX) 250 mg capsule   Yes No   Sig: Take 250 mg by mouth 2 (two) times a day   Patient not taking: Reported on 11/8/2024   clobetasol (TEMOVATE) 0.05 % cream   Yes No   Sig: Apply 1 application. topically 2 (two) times a day   Patient not taking: Reported on 11/8/2024   clotrimazole-betamethasone (LOTRISONE) 1-0.05 % cream   No No   Sig: Apply topically 2 (two) times a day   etonogestrel-ethinyl estradiol (NUVARING) 0.12-0.015 MG/24HR vaginal ring   Yes No   gabapentin (NEURONTIN) 600 MG tablet   Yes No   Sig: Take 600 mg by mouth Three times a day   gabapentin (NEURONTIN) 800 mg tablet   Yes No   metFORMIN (GLUCOPHAGE) 500 mg tablet   Yes No   Sig: Take 500 mg by mouth daily   methadone (Dolophine) 10 mg tablet   Yes No   Sig: Take 40 mg by mouth every 6 (six) hours as needed   Patient not taking: Reported on 11/8/2024   valACYclovir (VALTREX) 500 mg tablet   Yes No   Sig: Take 500 mg by mouth daily   zaleplon (SONATA) 5 MG capsule   Yes No   Sig: TAKE 1 CAPSULE BY MOUTH EVERY NIGHT AT BEDTIME AS NEEDED      Facility-Administered Medications: None     Discharge Medication List as of 11/19/2024  2:50 PM        START taking these medications    Details   aluminum-magnesium hydroxide-simethicone (MAALOX MAX) 400-400-40 MG/5ML suspension Take 10 mL by mouth every 6 (six) hours as needed for indigestion or heartburn, Starting Tue 11/19/2024, Normal      omeprazole (PriLOSEC) 20 mg delayed release capsule Take 1 capsule (20 mg total) by mouth daily, Starting Tue 11/19/2024, Normal      sucralfate (CARAFATE) 1 g tablet Take 1 tablet (1 g total) by mouth 4 (four) times a day, Starting Tue 11/19/2024, Normal            CONTINUE these medications which have NOT CHANGED    Details   amphetamine-dextroamphetamine (ADDERALL XR) 25 MG 24 hr capsule Take 1 tablet by mouth daily, Historical Med      amphetamine-dextroamphetamine (ADDERALL XR, 30MG,) 30 MG 24 hr capsule Historical Med      buPROPion (WELLBUTRIN XL) 300 mg 24 hr tablet Take 300 mg by mouth daily, Historical Med      cephalexin (KEFLEX) 250 mg capsule Take 250 mg by mouth 2 (two) times a day, Historical Med      clobetasol (TEMOVATE) 0.05 % cream Apply 1 application. topically 2 (two) times a day, Historical Med      clotrimazole-betamethasone (LOTRISONE) 1-0.05 % cream Apply topically 2 (two) times a day, Starting Fri 11/8/2024, Normal      Effexor XR 75 MG 24 hr capsule Historical Med      etonogestrel-ethinyl estradiol (NUVARING) 0.12-0.015 MG/24HR vaginal ring Historical Med      !! gabapentin (NEURONTIN) 600 MG tablet Take 600 mg by mouth Three times a day, Historical Med      !! gabapentin (NEURONTIN) 800 mg tablet Historical Med      LORazepam (ATIVAN) 0.5 mg tablet Historical Med      Lunesta 3 MG tablet Historical Med      metFORMIN (GLUCOPHAGE) 500 mg tablet Take 500 mg by mouth daily, Historical Med      methadone (Dolophine) 10 mg tablet Take 40 mg by mouth every 6 (six) hours as needed, Historical Med      valACYclovir (VALTREX) 500 mg tablet Take 500 mg by mouth daily, Historical Med      zaleplon (SONATA) 5 MG capsule TAKE 1 CAPSULE BY MOUTH EVERY NIGHT AT BEDTIME AS NEEDED, Historical Med       !! - Potential duplicate medications found. Please discuss with provider.          ED SEPSIS DOCUMENTATION   Time reflects when diagnosis was documented in both MDM as applicable and the Disposition within this note       Time User Action Codes Description Comment    11/19/2024  2:48 PM Adonis Bob [R10.13] Epigastric pain     11/19/2024  2:49 PM Adonis Bob [R30.0] Dysuria                  Adonis Bob PA-C  11/24/24  [id number]

## 2024-12-02 ENCOUNTER — TELEPHONE (OUTPATIENT)
Age: 34
End: 2024-12-02

## 2024-12-02 NOTE — TELEPHONE ENCOUNTER
Patient was calling to schedule her referral to Urology. The referral states Dysuria. The patient states she has been having UTI symptoms. But the urine testing always comes back negative. She also states having pain in her lower urinary track.     She thinks it may have something to do with the medication she was taking.

## 2025-04-24 ENCOUNTER — HOSPITAL ENCOUNTER (EMERGENCY)
Facility: HOSPITAL | Age: 35
Discharge: HOME | End: 2025-04-25
Attending: EMERGENCY MEDICINE
Payer: MEDICARE

## 2025-04-24 ENCOUNTER — APPOINTMENT (EMERGENCY)
Dept: CT IMAGING | Facility: HOSPITAL | Age: 35
End: 2025-04-24
Payer: MEDICARE

## 2025-04-24 DIAGNOSIS — K62.89 RECTAL PAIN: ICD-10-CM

## 2025-04-24 DIAGNOSIS — K64.9 HEMORRHOID: Primary | ICD-10-CM

## 2025-04-24 LAB
ALBUMIN SERPL BCG-MCNC: 4.3 G/DL (ref 3.5–5)
ALP SERPL-CCNC: 52 U/L (ref 34–104)
ALT SERPL W P-5'-P-CCNC: 32 U/L (ref 7–52)
ANION GAP SERPL CALCULATED.3IONS-SCNC: 9 MMOL/L (ref 4–13)
AST SERPL W P-5'-P-CCNC: 24 U/L (ref 13–39)
BASOPHILS # BLD AUTO: 0.04 THOUSANDS/ÂΜL (ref 0–0.1)
BASOPHILS NFR BLD AUTO: 1 % (ref 0–1)
BILIRUB SERPL-MCNC: 0.27 MG/DL (ref 0.2–1)
BUN SERPL-MCNC: 16 MG/DL (ref 5–25)
CALCIUM SERPL-MCNC: 9.5 MG/DL (ref 8.4–10.2)
CHLORIDE SERPL-SCNC: 105 MMOL/L (ref 96–108)
CO2 SERPL-SCNC: 24 MMOL/L (ref 21–32)
CREAT SERPL-MCNC: 0.67 MG/DL (ref 0.6–1.3)
EOSINOPHIL # BLD AUTO: 0.36 THOUSAND/ÂΜL (ref 0–0.61)
EOSINOPHIL NFR BLD AUTO: 4 % (ref 0–6)
ERYTHROCYTE [DISTWIDTH] IN BLOOD BY AUTOMATED COUNT: 12.8 % (ref 11.6–15.1)
GFR SERPL CREATININE-BSD FRML MDRD: 115 ML/MIN/1.73SQ M
GLUCOSE SERPL-MCNC: 82 MG/DL (ref 65–140)
HCG SERPL QL: NEGATIVE
HCT VFR BLD AUTO: 39 % (ref 34.8–46.1)
HGB BLD-MCNC: 13.4 G/DL (ref 11.5–15.4)
IMM GRANULOCYTES # BLD AUTO: 0.01 THOUSAND/UL (ref 0–0.2)
IMM GRANULOCYTES NFR BLD AUTO: 0 % (ref 0–2)
LYMPHOCYTES # BLD AUTO: 2.8 THOUSANDS/ÂΜL (ref 0.6–4.47)
LYMPHOCYTES NFR BLD AUTO: 34 % (ref 14–44)
MCH RBC QN AUTO: 30.8 PG (ref 26.8–34.3)
MCHC RBC AUTO-ENTMCNC: 34.4 G/DL (ref 31.4–37.4)
MCV RBC AUTO: 90 FL (ref 82–98)
MONOCYTES # BLD AUTO: 0.48 THOUSAND/ÂΜL (ref 0.17–1.22)
MONOCYTES NFR BLD AUTO: 6 % (ref 4–12)
NEUTROPHILS # BLD AUTO: 4.66 THOUSANDS/ÂΜL (ref 1.85–7.62)
NEUTS SEG NFR BLD AUTO: 55 % (ref 43–75)
NRBC BLD AUTO-RTO: 0 /100 WBCS
PLATELET # BLD AUTO: 254 THOUSANDS/UL (ref 149–390)
PMV BLD AUTO: 8.5 FL (ref 8.9–12.7)
POTASSIUM SERPL-SCNC: 3.7 MMOL/L (ref 3.5–5.3)
PROT SERPL-MCNC: 7.3 G/DL (ref 6.4–8.4)
RBC # BLD AUTO: 4.35 MILLION/UL (ref 3.81–5.12)
SODIUM SERPL-SCNC: 138 MMOL/L (ref 135–147)
WBC # BLD AUTO: 8.35 THOUSAND/UL (ref 4.31–10.16)

## 2025-04-24 PROCEDURE — 85025 COMPLETE CBC W/AUTO DIFF WBC: CPT | Performed by: EMERGENCY MEDICINE

## 2025-04-24 PROCEDURE — 99285 EMERGENCY DEPT VISIT HI MDM: CPT | Performed by: EMERGENCY MEDICINE

## 2025-04-24 PROCEDURE — 80053 COMPREHEN METABOLIC PANEL: CPT | Performed by: EMERGENCY MEDICINE

## 2025-04-24 PROCEDURE — 74177 CT ABD & PELVIS W/CONTRAST: CPT

## 2025-04-24 PROCEDURE — 99283 EMERGENCY DEPT VISIT LOW MDM: CPT

## 2025-04-24 PROCEDURE — 84703 CHORIONIC GONADOTROPIN ASSAY: CPT | Performed by: EMERGENCY MEDICINE

## 2025-04-24 PROCEDURE — 36415 COLL VENOUS BLD VENIPUNCTURE: CPT | Performed by: EMERGENCY MEDICINE

## 2025-04-24 RX ORDER — OLANZAPINE 5 MG/1
5 TABLET, FILM COATED ORAL
COMMUNITY

## 2025-04-24 RX ADMIN — IOHEXOL 85 ML: 350 INJECTION, SOLUTION INTRAVENOUS at 22:36

## 2025-04-24 NOTE — Clinical Note
Vijaya Nguyen was seen and treated in our emergency department on 4/24/2025.    No restrictions    Other - See Comments    None    Diagnosis: Medical evaluation in ER    Vijaya  may return to work on return date, is off the rest of the shift today.    She may return on this date: 04/26/2025         If you have any questions or concerns, please don't hesitate to call.      Trevon Padilla MD    ______________________________           _______________          _______________  Hospital Representative                              Date                                Time

## 2025-04-25 VITALS
HEART RATE: 81 BPM | DIASTOLIC BLOOD PRESSURE: 78 MMHG | SYSTOLIC BLOOD PRESSURE: 135 MMHG | RESPIRATION RATE: 18 BRPM | OXYGEN SATURATION: 98 % | TEMPERATURE: 98 F

## 2025-04-25 RX ORDER — BENZOCAINE/MENTHOL 6 MG-10 MG
LOZENGE MUCOUS MEMBRANE
Qty: 15 G | Refills: 0 | Status: SHIPPED | OUTPATIENT
Start: 2025-04-25

## 2025-04-25 NOTE — ED PROVIDER NOTES
Time reflects when diagnosis was documented in both MDM as applicable and the Disposition within this note       Time User Action Codes Description Comment    4/25/2025 12:09 AM Trevon Padilla Add [K64.9] Hemorrhoid     4/25/2025 12:09 AM Trevon Padilla Add [K62.89] Rectal pain           ED Disposition       ED Disposition   Discharge    Condition   Stable    Date/Time   Fri Apr 25, 2025 12:09 AM    Comment   Vijaya Nguyen discharge to home/self care.                   Assessment & Plan       Medical Decision Making  34 y/o female presents to the ED for evaluation of rectal pain and possible perirectal abscess x 4 days.  She states that her symptoms started while she was driving home with her significant other from a trip to [facility] 4 days ago.  The pain is worse with sitting and with bowel movements.  She denies any rectal itching.  She notes that she had what she thought was a perirectal/rectal abscess several years ago that she did not seek medical attention for the time and noted that it resolved after spontaneously draining with pus and blood several days after onset.  She denies any other symptoms or complaints.  She did not take any medications for her symptoms prior to arrival.    Vital signs reviewed. Chaperoned anorectal examination performed with female chaperone. There appears to be a nonthrombosed hemorrhoid that is mildly tender to palpation at the 5 o'clock position.  No fluctuance, induration, or overlying skin changes.  No active drainage or bleeding.  No rectal tear or fissure.  Differential diagnosis includes but is not limited to hemorrhoid and/or rectal/perirectal abscess.  Given exam findings and history, findings appear most consistent with a hemorrhoid, however given the patient's reported history of prior rectal/perirectal abscess, I offered the patient additional testing including labs and CT imaging and she is agreeable to further workup.  Labs overall WNL.  CT imaging  shows no acute findings, no evidence of abscess.  I discussed with the patient regarding symptomatic management, including Preparation H and sitz bath, as well as outpatient follow-up with primary care and colorectal specialist. I discussed all findings, treatment, red flags/return precautions, and outpatient follow-up and the patient/family understand and agree. Stable for discharge.    Amount and/or Complexity of Data Reviewed  Labs: ordered. Decision-making details documented in ED Course.  Radiology: ordered. Decision-making details documented in ED Course.    Risk  Prescription drug management.        ED Course as of 04/25/25 0022   Thu Apr 24, 2025 2205 CBC and differential(!)  Normal WBC, hemoglobin, hematocrit, and platelet count.   2207 Comprehensive metabolic panel  All WNL   2214 PREGNANCY, SERUM: Negative  Negative   2324 CT abdomen pelvis with contrast  No acute findings in the abdomen or pelvis.       Medications   iohexol (OMNIPAQUE) 350 MG/ML injection (SINGLE-DOSE) 85 mL (85 mL Intravenous Given 4/24/25 2236)       ED Risk Strat Scores                    No data recorded                            History of Present Illness       Chief Complaint   Patient presents with    Abscess     Pt reports abscess in perineal area that she noticed 4 days ago. No meds pta        History reviewed. No pertinent past medical history.   History reviewed. No pertinent surgical history.   History reviewed. No pertinent family history.   Social History     Tobacco Use    Smoking status: Every Day     Current packs/day: 0.50     Types: Cigarettes    Smokeless tobacco: Never   Vaping Use    Vaping status: Some Days    Substances: Nicotine, THC, Flavoring   Substance Use Topics    Alcohol use: Not Currently     Comment: 2x per mo    Drug use: Not Currently     Types: Marijuana      E-Cigarette/Vaping    E-Cigarette Use Current Some Day User       E-Cigarette/Vaping Substances    Nicotine Yes     THC Yes     Flavoring  Yes       I have reviewed and agree with the history as documented.     36 y/o female presents to the ED for evaluation of rectal pain and possible perirectal abscess x 4 days.  She states that her symptoms started while she was driving home with her significant other from a trip to [facility] 4 days ago.  The pain is worse with sitting and with bowel movements.  She denies any rectal itching.  She notes that she had what she thought was a perirectal/rectal abscess several years ago that she did not seek medical attention for the time and noted that it resolved after spontaneously draining with pus and blood several days after onset.  She denies any other symptoms or complaints.  She did not take any medications for her symptoms prior to arrival.        Review of Systems   Constitutional:  Negative for chills and fever.   HENT:  Negative for congestion, rhinorrhea and sore throat.    Respiratory:  Negative for cough and shortness of breath.    Cardiovascular:  Negative for chest pain and palpitations.   Gastrointestinal:  Negative for abdominal pain, diarrhea, nausea and vomiting.        Rectal pain, see HPI   Genitourinary:  Negative for dysuria and hematuria.   Musculoskeletal:  Negative for back pain and neck pain.   Neurological:  Negative for dizziness, weakness, light-headedness, numbness and headaches.   All other systems reviewed and are negative.          Objective       ED Triage Vitals [04/24/25 2111]   Temperature Pulse Blood Pressure Respirations SpO2 Patient Position - Orthostatic VS   98 °F (36.7 °C) 86 148/74 18 96 % Sitting      Temp Source Heart Rate Source BP Location FiO2 (%) Pain Score    Oral Monitor Right arm -- --      Vitals      Date and Time Temp Pulse SpO2 Resp BP Pain Score FACES Pain Rating User   04/25/25 0018 -- 81 98 % 18 135/78 -- -- DS   04/24/25 2111 98 °F (36.7 °C) 86 96 % 18 148/74 -- -- BM            Physical Exam  Vitals and nursing note reviewed.   Constitutional:        General: She is not in acute distress.     Appearance: Normal appearance. She is normal weight. She is not ill-appearing.   HENT:      Head: Normocephalic and atraumatic.      Right Ear: External ear normal.      Left Ear: External ear normal.      Nose: Nose normal. No congestion or rhinorrhea.      Mouth/Throat:      Mouth: Mucous membranes are moist.      Pharynx: Oropharynx is clear. No oropharyngeal exudate or posterior oropharyngeal erythema.   Eyes:      Extraocular Movements: Extraocular movements intact.      Conjunctiva/sclera: Conjunctivae normal.      Pupils: Pupils are equal, round, and reactive to light.   Cardiovascular:      Rate and Rhythm: Normal rate and regular rhythm.      Pulses: Normal pulses.      Heart sounds: Normal heart sounds. No murmur heard.  Pulmonary:      Effort: Pulmonary effort is normal. No respiratory distress.      Breath sounds: Normal breath sounds. No wheezing or rales.   Abdominal:      General: Abdomen is flat. Bowel sounds are normal. There is no distension.      Palpations: Abdomen is soft.      Tenderness: There is no abdominal tenderness. There is no right CVA tenderness, left CVA tenderness or guarding.   Genitourinary:     Comments: Chaperoned anorectal examination performed with female chaperone. There appears to be a nonthrombosed hemorrhoid that is mildly tender to palpation at the 5 o'clock position.  No fluctuance, induration, or overlying skin changes.  No active drainage or bleeding.  No rectal tear or fissure.  Musculoskeletal:         General: No swelling or tenderness. Normal range of motion.      Cervical back: Normal range of motion and neck supple. No tenderness.   Skin:     General: Skin is warm and dry.      Capillary Refill: Capillary refill takes less than 2 seconds.   Neurological:      General: No focal deficit present.      Mental Status: She is alert and oriented to person, place, and time.         Results Reviewed       Procedure Component  Value Units Date/Time    hCG, qualitative pregnancy [[id number]]  (Normal) Collected: 04/24/25 2146    Lab Status: Final result Specimen: Blood from Arm, Right Updated: 04/24/25 2212     Preg, Serum Negative    Comprehensive metabolic panel [[id number]] Collected: 04/24/25 2146    Lab Status: Final result Specimen: Blood from Arm, Right Updated: 04/24/25 2206     Sodium 138 mmol/L      Potassium 3.7 mmol/L      Chloride 105 mmol/L      CO2 24 mmol/L      ANION GAP 9 mmol/L      BUN 16 mg/dL      Creatinine 0.67 mg/dL      Glucose 82 mg/dL      Calcium 9.5 mg/dL      AST 24 U/L      ALT 32 U/L      Alkaline Phosphatase 52 U/L      Total Protein 7.3 g/dL      Albumin 4.3 g/dL      Total Bilirubin 0.27 mg/dL      eGFR 115 ml/min/1.73sq m     Narrative:      National Kid[employer] guidelines for Chronic Kidney Disease (CKD):     Stage 1 with normal or high GFR (GFR > 90 mL/min/1.73 square meters)    Stage 2 Mild CKD (GFR = 60-89 mL/min/1.73 square meters)    Stage 3A Moderate CKD (GFR = 45-59 mL/min/1.73 square meters)    Stage 3B Moderate CKD (GFR = 30-44 mL/min/1.73 square meters)    Stage 4 Severe CKD (GFR = 15-29 mL/min/1.73 square meters)    Stage 5 End Stage CKD (GFR <15 mL/min/1.73 square meters)  Note: GFR calculation is accurate only with a steady state creatinine    CBC and differential [[id number]]  (Abnormal) Collected: 04/24/25 2146    Lab Status: Final result Specimen: Blood from Arm, Right Updated: 04/24/25 2151     WBC 8.35 Thousand/uL      RBC 4.35 Million/uL      Hemoglobin 13.4 g/dL      Hematocrit 39.0 %      MCV 90 fL      MCH 30.8 pg      MCHC 34.4 g/dL      RDW 12.8 %      MPV 8.5 fL      Platelets 254 Thousands/uL      nRBC 0 /100 WBCs      Segmented % 55 %      Immature Grans % 0 %      Lymphocytes % 34 %      Monocytes % 6 %      Eosinophils Relative 4 %      Basophils Relative 1 %      Absolute Neutrophils 4.66 Thousands/µL      Absolute Immature Grans 0.01 Thousand/uL      Absolute  Lymphocytes 2.80 Thousands/µL      Absolute Monocytes 0.48 Thousand/µL      Eosinophils Absolute 0.36 Thousand/µL      Basophils Absolute 0.04 Thousands/µL             CT abdomen pelvis with contrast   Final Interpretation by Slava Queen DO (04/24 2314)      No acute findings in the abdomen or pelvis.         Workstation performed: [id number]             Procedures    ED Medication and Procedure Management   Prior to Admission Medications   Prescriptions Last Dose Informant Patient Reported? Taking?   Effexor XR 75 MG 24 hr capsule 4/24/2025  Yes Yes   LORazepam (ATIVAN) 0.5 mg tablet 4/24/2025  Yes Yes   Lunesta 3 MG tablet Past Week  Yes Yes   OLANZapine (ZyPREXA) 5 mg tablet 4/23/2025  Yes Yes   Sig: Take 5 mg by mouth daily at bedtime   aluminum-magnesium hydroxide-simethicone (MAALOX MAX) 400-400-40 MG/5ML suspension Not Taking  No No   Sig: Take 10 mL by mouth every 6 (six) hours as needed for indigestion or heartburn   Patient not taking: Reported on 4/24/2025   amphetamine-dextroamphetamine (ADDERALL XR, 30MG,) 30 MG 24 hr capsule 4/24/2025  Yes Yes   buPROPion (WELLBUTRIN XL) 300 mg 24 hr tablet 4/24/2025  Yes Yes   Sig: Take 300 mg by mouth daily   cephalexin (KEFLEX) 250 mg capsule   Yes No   Sig: Take 250 mg by mouth 2 (two) times a day   Patient not taking: Reported on 11/8/2024   clobetasol (TEMOVATE) 0.05 % cream   Yes No   Sig: Apply 1 application. topically 2 (two) times a day   Patient not taking: Reported on 11/8/2024   clotrimazole-betamethasone (LOTRISONE) 1-0.05 % cream Not Taking  No No   Sig: Apply topically 2 (two) times a day   Patient not taking: Reported on 4/24/2025   etonogestrel-ethinyl estradiol (NUVARING) 0.12-0.015 MG/24HR vaginal ring 4/24/2025  Yes Yes   gabapentin (NEURONTIN) 600 MG tablet Not Taking  Yes No   Sig: Take 600 mg by mouth Three times a day   Patient not taking: Reported on 4/24/2025   gabapentin (NEURONTIN) 800 mg tablet 4/24/2025  Yes Yes   metFORMIN  (GLUCOPHAGE) 500 mg tablet More than a month  Yes No   Sig: Take 500 mg by mouth daily   methadone (Dolophine) 10 mg tablet   Yes No   Sig: Take 40 mg by mouth every 6 (six) hours as needed   Patient not taking: Reported on 11/8/2024   methocarbamol (ROBAXIN) 750 mg tablet Past Month  Yes Yes   nitrofurantoin (MACROBID) 100 mg capsule Not Taking  No No   Sig: TAKE 1 CAPSULE BY MOUTH TWICE A DAY FOR 7 DAYS   Patient not taking: Reported on 4/24/2025   omeprazole (PriLOSEC) 20 mg delayed release capsule 4/24/2025  No Yes   Sig: Take 1 capsule (20 mg total) by mouth daily   sucralfate (CARAFATE) 1 g tablet Not Taking  No No   Sig: Take 1 tablet (1 g total) by mouth 4 (four) times a day   Patient not taking: Reported on 4/24/2025   valACYclovir (VALTREX) 1,000 mg tablet More than a month  Yes No   valACYclovir (VALTREX) 500 mg tablet More than a month  Yes No   Sig: Take 500 mg by mouth daily   zaleplon (SONATA) 5 MG capsule Not Taking  Yes No   Sig: TAKE 1 CAPSULE BY MOUTH EVERY NIGHT AT BEDTIME AS NEEDED   Patient not taking: Reported on 4/24/2025      Facility-Administered Medications: None     Discharge Medication List as of 4/25/2025 12:11 AM        START taking these medications    Details   hydrocortisone 1 % cream Apply to affected area 2 times daily, Normal           CONTINUE these medications which have NOT CHANGED    Details   amphetamine-dextroamphetamine (ADDERALL XR, 30MG,) 30 MG 24 hr capsule Historical Med      buPROPion (WELLBUTRIN XL) 300 mg 24 hr tablet Take 300 mg by mouth daily, Historical Med      Effexor XR 75 MG 24 hr capsule Historical Med      etonogestrel-ethinyl estradiol (NUVARING) 0.12-0.015 MG/24HR vaginal ring Historical Med      !! gabapentin (NEURONTIN) 800 mg tablet Historical Med      LORazepam (ATIVAN) 0.5 mg tablet Historical Med      Lunesta 3 MG tablet Historical Med      methocarbamol (ROBAXIN) 750 mg tablet Historical Med      OLANZapine (ZyPREXA) 5 mg tablet Take 5 mg by mouth  daily at bedtime, Historical Med      omeprazole (PriLOSEC) 20 mg delayed release capsule Take 1 capsule (20 mg total) by mouth daily, Starting Tue 11/19/2024, Normal      aluminum-magnesium hydroxide-simethicone (MAALOX MAX) 400-400-40 MG/5ML suspension Take 10 mL by mouth every 6 (six) hours as needed for indigestion or heartburn, Starting Tue 11/19/2024, Normal      cephalexin (KEFLEX) 250 mg capsule Take 250 mg by mouth 2 (two) times a day, Historical Med      clobetasol (TEMOVATE) 0.05 % cream Apply 1 application. topically 2 (two) times a day, Historical Med      clotrimazole-betamethasone (LOTRISONE) 1-0.05 % cream Apply topically 2 (two) times a day, Starting Fri 11/8/2024, Normal      !! gabapentin (NEURONTIN) 600 MG tablet Take 600 mg by mouth Three times a day, Historical Med      metFORMIN (GLUCOPHAGE) 500 mg tablet Take 500 mg by mouth daily, Historical Med      methadone (Dolophine) 10 mg tablet Take 40 mg by mouth every 6 (six) hours as needed, Historical Med      nitrofurantoin (MACROBID) 100 mg capsule TAKE 1 CAPSULE BY MOUTH TWICE A DAY FOR 7 DAYS, Normal      sucralfate (CARAFATE) 1 g tablet Take 1 tablet (1 g total) by mouth 4 (four) times a day, Starting Tue 11/19/2024, Normal      !! valACYclovir (VALTREX) 1,000 mg tablet Historical Med      !! valACYclovir (VALTREX) 500 mg tablet Take 500 mg by mouth daily, Historical Med      zaleplon (SONATA) 5 MG capsule TAKE 1 CAPSULE BY MOUTH EVERY NIGHT AT BEDTIME AS NEEDED, Historical Med       !! - Potential duplicate medications found. Please discuss with provider.          ED SEPSIS DOCUMENTATION   Time reflects when diagnosis was documented in both MDM as applicable and the Disposition within this note       Time User Action Codes Description Comment    4/25/2025 12:09 AM Trevon Padilla [K64.9] Hemorrhoid     4/25/2025 12:09 AM Trevon Padilla [K62.89] Rectal pain                  Trevon Padilla MD  04/25/25 0728

## 2025-05-12 ENCOUNTER — TELEPHONE (OUTPATIENT)
Age: 35
End: 2025-05-12

## 2025-05-12 NOTE — TELEPHONE ENCOUNTER
VM informing patient appointment with Dr Arce on 5/28/25 at 2:20 pm is now at the [facility] office, [address] [facility].

## 2025-06-13 ENCOUNTER — TELEPHONE (OUTPATIENT)
Age: 35
End: 2025-06-13